# Patient Record
Sex: MALE | Race: WHITE | Employment: UNEMPLOYED | URBAN - METROPOLITAN AREA
[De-identification: names, ages, dates, MRNs, and addresses within clinical notes are randomized per-mention and may not be internally consistent; named-entity substitution may affect disease eponyms.]

---

## 2020-11-22 ENCOUNTER — HOSPITAL ENCOUNTER (EMERGENCY)
Age: 20
Discharge: HOME | End: 2020-11-22
Attending: EMERGENCY MEDICINE
Payer: COMMERCIAL

## 2020-11-22 VITALS
WEIGHT: 166.67 LBS | HEIGHT: 74 IN | SYSTOLIC BLOOD PRESSURE: 130 MMHG | DIASTOLIC BLOOD PRESSURE: 90 MMHG | OXYGEN SATURATION: 100 % | HEART RATE: 66 BPM | TEMPERATURE: 98.4 F | RESPIRATION RATE: 18 BRPM | BODY MASS INDEX: 21.39 KG/M2

## 2020-11-22 PROCEDURE — 99283 EMERGENCY DEPT VISIT LOW MDM: CPT

## 2020-11-22 ASSESSMENT — PAIN SCALES - GENERAL: PAINLEVEL_OUTOF10: 2

## 2020-11-22 ASSESSMENT — PAIN DESCRIPTION - DESCRIPTORS: DESCRIPTORS: HEADACHE

## 2020-11-22 ASSESSMENT — PAIN DESCRIPTION - FREQUENCY: FREQUENCY: CONTINUOUS

## 2020-11-22 ASSESSMENT — PAIN DESCRIPTION - LOCATION: LOCATION: HEAD

## 2020-11-22 ASSESSMENT — PAIN DESCRIPTION - PAIN TYPE: TYPE: ACUTE PAIN

## 2020-11-22 NOTE — ED NOTES
Walked pt from [facility] to ED bed. Obtained VS. Pt wearing mask, medic wearing mask, gloves, safety glasses.      Josh Mcgill, EMT-P  11/22/20 2236

## 2020-11-23 NOTE — ED PROVIDER NOTES
eMERGENCY dEPARTMENT eNCOUnter      Pt Name: Curt Liu  MRN: [id number]  [place] 2000  Date of evaluation: 11/22/2020  Provider: Panchito Miller MD     [address] [place] [address]       Chief Complaint   Patient presents with    Head Injury     pt was drinking 2 nights ago and fell hitting head, \"2 or 3 times\", with emesis. pt states he has trouble remembering event but states \"I don't know if it was because I was drinking. \" pain 2/10         HISTORY OF PRESENT ILLNESS   (Location/Symptom, Timing/Onset,Context/Setting, Quality, Duration, Modifying Factors, Severity) Note limiting factors. HPI    Curt Liu is a 21 y.o. male who presents to the emergency department with a mild head injury. 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 And when walking hit his head and had a concussion he thought. Patient is blurry and then easing to the event. Patient states he is due for school tomorrow but does not feel well. There is no more vomiting. Patient just does not remember too much of the event. Patient is moving all extremities. Patient denies any blurred vision. Nursing Notes were reviewed. REVIEW OFSYSTEMS    (2+ for level 4; 10+ for level 5)   Review of Systems    General: No fevers, chills or night sweats, No weight loss    Head:  No Sore throat,  No Ear Pain    Chest:  Nontender. No Cough, No SOB,  Chest Pain    GI: No abdominal pain or vomiting    : No dysuria or hematuria    Musculoskeletal: No unrelenting pain or night pain    Neurologic: No bowel or bladder incontinence, No saddle anesthesia, No leg weakness    All other systems reviewed and are negative. PAST MEDICAL HISTORY   History reviewed. No pertinent past medical history. SURGICAL HISTORY     History reviewed. No pertinent surgical history. CURRENT MEDICATIONS     There are no discharge medications for this patient.       ALLERGIES     Patient has no known Mild closed head injury, initial encounter          DISPOSITION/PLAN   DISPOSITION Decision To Discharge 11/22/2020 06:01:49 PM      PATIENT REFERRED TO:  Family physician    Schedule an appointment as soon as possible for a visit in 1 week  If symptoms worsen      DISCHARGE MEDICATIONS:  There are no discharge medications for this patient. (Please note:  Portions of this note were completed with a voice recognition program.Efforts were made to edit the dictations but occasionally words and phrases are mis-transcribed.)  Form [id number]    Beatriz LOPEZ MD (electronically signed)  Emergency Medicine Provider        Yancy Wall MD  11/22/20 0340